# Patient Record
Sex: FEMALE | Employment: OTHER | URBAN - METROPOLITAN AREA
[De-identification: names, ages, dates, MRNs, and addresses within clinical notes are randomized per-mention and may not be internally consistent; named-entity substitution may affect disease eponyms.]

---

## 2019-08-16 ENCOUNTER — PREPPED CHART (OUTPATIENT)
Dept: URBAN - METROPOLITAN AREA CLINIC 79 | Facility: CLINIC | Age: 64
End: 2019-08-16

## 2020-03-12 ENCOUNTER — RX CHECK (OUTPATIENT)
Dept: URBAN - METROPOLITAN AREA CLINIC 79 | Facility: CLINIC | Age: 65
End: 2020-03-12

## 2020-03-12 DIAGNOSIS — H52.13: ICD-10-CM

## 2020-03-12 PROCEDURE — 92015 DETERMINE REFRACTIVE STATE: CPT

## 2020-03-12 ASSESSMENT — VISUAL ACUITY
OD_SC: 20/400
OS_SC: 20/300
OS_CC: J5
OD_CC: J16
OD_CC: 20/40
OD_PH: 20/25+2
OS_CC: 20/30+2

## 2020-06-19 ENCOUNTER — CONSULT EP (OUTPATIENT)
Dept: URBAN - METROPOLITAN AREA CLINIC 79 | Facility: CLINIC | Age: 65
End: 2020-06-19

## 2020-06-19 VITALS — HEIGHT: 55 IN

## 2020-06-19 DIAGNOSIS — H52.13: ICD-10-CM

## 2020-06-19 DIAGNOSIS — H25.13: ICD-10-CM

## 2020-06-19 PROCEDURE — 92134 CPTRZ OPH DX IMG PST SGM RTA: CPT

## 2020-06-19 PROCEDURE — 92014 COMPRE OPH EXAM EST PT 1/>: CPT

## 2020-06-19 ASSESSMENT — VISUAL ACUITY
OD_CC: 20/40-2
OS_SC: J5
OD_SC: 20/400
OS_SC: 20/500
OD_SC: J7
OS_CC: 20/50-2
OS_PH: 20/30

## 2020-07-01 ENCOUNTER — CONSULT EP (OUTPATIENT)
Dept: URBAN - METROPOLITAN AREA CLINIC 79 | Facility: CLINIC | Age: 65
End: 2020-07-01

## 2020-07-01 VITALS — HEIGHT: 55 IN

## 2020-07-01 DIAGNOSIS — H35.413: ICD-10-CM

## 2020-07-01 DIAGNOSIS — Q14.1: ICD-10-CM

## 2020-07-01 PROCEDURE — 92014 COMPRE OPH EXAM EST PT 1/>: CPT

## 2020-07-01 PROCEDURE — 92250 FUNDUS PHOTOGRAPHY W/I&R: CPT

## 2020-07-01 ASSESSMENT — VISUAL ACUITY
OS_CC: 20/30
OD_CC: 20/30-2

## 2020-07-01 ASSESSMENT — TONOMETRY
OD_IOP_MMHG: 10
OS_IOP_MMHG: 10

## 2020-07-07 ENCOUNTER — FOLLOW UP (OUTPATIENT)
Dept: URBAN - METROPOLITAN AREA CLINIC 79 | Facility: CLINIC | Age: 65
End: 2020-07-07

## 2020-07-07 VITALS — HEIGHT: 55 IN

## 2020-07-07 DIAGNOSIS — H25.12: ICD-10-CM

## 2020-07-07 DIAGNOSIS — H52.13: ICD-10-CM

## 2020-07-07 PROCEDURE — 92136 OPHTHALMIC BIOMETRY: CPT

## 2020-07-07 PROCEDURE — 92025 CPTRIZED CORNEAL TOPOGRAPHY: CPT

## 2020-07-07 PROCEDURE — 92012 INTRM OPH EXAM EST PATIENT: CPT

## 2020-07-07 ASSESSMENT — TONOMETRY
OS_IOP_MMHG: 12
OD_IOP_MMHG: 12

## 2020-07-07 ASSESSMENT — VISUAL ACUITY
OD_CC: 20/30-2
OS_CC: 20/30-2

## 2020-07-15 ENCOUNTER — SURGERY/PROCEDURE (OUTPATIENT)
Dept: URBAN - METROPOLITAN AREA SURGICAL CENTER 17 | Facility: SURGICAL CENTER | Age: 65
End: 2020-07-15

## 2020-07-15 DIAGNOSIS — H25.12: ICD-10-CM

## 2020-07-15 PROCEDURE — 66984 XCAPSL CTRC RMVL W/O ECP: CPT

## 2020-07-16 ENCOUNTER — 1 DAY POST-OP (OUTPATIENT)
Dept: URBAN - METROPOLITAN AREA CLINIC 79 | Facility: CLINIC | Age: 65
End: 2020-07-16

## 2020-07-16 VITALS — HEIGHT: 55 IN

## 2020-07-16 DIAGNOSIS — Z96.1: ICD-10-CM

## 2020-07-16 PROCEDURE — 99024 POSTOP FOLLOW-UP VISIT: CPT

## 2020-07-16 ASSESSMENT — TONOMETRY: OS_IOP_MMHG: 14

## 2020-07-16 ASSESSMENT — VISUAL ACUITY
OS_SC: 20/20
OS_SC: 20/30

## 2020-07-21 ENCOUNTER — FOLLOW UP (OUTPATIENT)
Dept: URBAN - METROPOLITAN AREA CLINIC 79 | Facility: CLINIC | Age: 65
End: 2020-07-21

## 2020-07-21 DIAGNOSIS — H25.11: ICD-10-CM

## 2020-07-21 PROCEDURE — 92136 OPHTHALMIC BIOMETRY: CPT | Mod: 26,RT

## 2020-07-21 PROCEDURE — 92012 INTRM OPH EXAM EST PATIENT: CPT | Mod: 24

## 2020-07-21 ASSESSMENT — VISUAL ACUITY
OS_SC: 20/20-1
OS_SC: J1
OD_CC: 20/40-2

## 2020-07-21 ASSESSMENT — TONOMETRY
OS_IOP_MMHG: 16
OD_IOP_MMHG: 14

## 2020-08-04 ENCOUNTER — FOLLOW UP (OUTPATIENT)
Dept: URBAN - METROPOLITAN AREA CLINIC 79 | Facility: CLINIC | Age: 65
End: 2020-08-04

## 2020-08-04 VITALS — HEIGHT: 55 IN

## 2020-08-04 DIAGNOSIS — Z96.1: ICD-10-CM

## 2020-08-04 PROCEDURE — 99024 POSTOP FOLLOW-UP VISIT: CPT

## 2020-08-04 ASSESSMENT — VISUAL ACUITY
OD_SC: 20/300
OD_PH: 20/70
OS_SC: 20/20
OU_SC: J1

## 2020-08-07 ENCOUNTER — FOLLOW UP (OUTPATIENT)
Dept: URBAN - METROPOLITAN AREA CLINIC 79 | Facility: CLINIC | Age: 65
End: 2020-08-07

## 2020-08-07 VITALS — HEIGHT: 55 IN

## 2020-08-07 DIAGNOSIS — Z96.1: ICD-10-CM

## 2020-08-07 DIAGNOSIS — H01.02A: ICD-10-CM

## 2020-08-07 PROCEDURE — 99024 POSTOP FOLLOW-UP VISIT: CPT

## 2020-08-07 ASSESSMENT — VISUAL ACUITY
OD_PH: 20/70-2
OD_SC: 20/300
OS_SC: 20/20

## 2020-08-10 ENCOUNTER — SURGERY/PROCEDURE (OUTPATIENT)
Dept: URBAN - METROPOLITAN AREA SURGICAL CENTER 17 | Facility: SURGICAL CENTER | Age: 65
End: 2020-08-10

## 2020-08-10 DIAGNOSIS — H25.11: ICD-10-CM

## 2020-08-10 PROCEDURE — 66984 XCAPSL CTRC RMVL W/O ECP: CPT | Mod: 79,RT

## 2020-08-11 ENCOUNTER — 1 DAY POST-OP (OUTPATIENT)
Dept: URBAN - METROPOLITAN AREA CLINIC 79 | Facility: CLINIC | Age: 65
End: 2020-08-11

## 2020-08-11 VITALS — HEIGHT: 55 IN

## 2020-08-11 DIAGNOSIS — Z96.1: ICD-10-CM

## 2020-08-11 PROCEDURE — 99024 POSTOP FOLLOW-UP VISIT: CPT

## 2020-08-11 ASSESSMENT — VISUAL ACUITY
OD_SC: 20/30-2
OU_SC: 20/20
OS_SC: J1
OU_SC: J1+
OS_SC: 20/20
OD_SC: J1

## 2020-08-11 ASSESSMENT — TONOMETRY
OD_IOP_MMHG: 18
OS_IOP_MMHG: 15

## 2020-08-20 ENCOUNTER — 1 WEEK POST-OP (OUTPATIENT)
Dept: URBAN - METROPOLITAN AREA CLINIC 79 | Facility: CLINIC | Age: 65
End: 2020-08-20

## 2020-08-20 VITALS — HEIGHT: 55 IN

## 2020-08-20 DIAGNOSIS — Z96.1: ICD-10-CM

## 2020-08-20 PROCEDURE — 99024 POSTOP FOLLOW-UP VISIT: CPT

## 2020-08-20 ASSESSMENT — VISUAL ACUITY
OD_SC: 20/20-1
OD_SC: J1
OS_SC: J1
OS_SC: 20/20-1

## 2020-09-24 ENCOUNTER — POST-OP CHECK (OUTPATIENT)
Dept: URBAN - METROPOLITAN AREA CLINIC 79 | Facility: CLINIC | Age: 65
End: 2020-09-24

## 2020-09-24 VITALS — HEIGHT: 55 IN

## 2020-09-24 DIAGNOSIS — Q14.1: ICD-10-CM

## 2020-09-24 DIAGNOSIS — Z96.1: ICD-10-CM

## 2020-09-24 PROCEDURE — 99024 POSTOP FOLLOW-UP VISIT: CPT

## 2020-09-24 PROCEDURE — 92015 DETERMINE REFRACTIVE STATE: CPT | Mod: NC

## 2020-09-24 ASSESSMENT — TONOMETRY
OS_IOP_MMHG: 10
OD_IOP_MMHG: 10

## 2020-09-24 ASSESSMENT — VISUAL ACUITY
OS_SC: 20/20-1
OD_SC: J2
OS_SC: J2
OD_SC: 20/20-1

## 2021-03-04 ENCOUNTER — FOLLOW UP (OUTPATIENT)
Dept: URBAN - METROPOLITAN AREA CLINIC 79 | Facility: CLINIC | Age: 66
End: 2021-03-04

## 2021-03-04 VITALS — HEIGHT: 55 IN

## 2021-03-04 DIAGNOSIS — Z96.1: ICD-10-CM

## 2021-03-04 PROCEDURE — 99214 OFFICE O/P EST MOD 30 MIN: CPT

## 2021-03-04 ASSESSMENT — VISUAL ACUITY
OD_SC: 20/20-1
OS_SC: 20/20

## 2021-08-17 ENCOUNTER — FOLLOW UP (OUTPATIENT)
Dept: URBAN - METROPOLITAN AREA CLINIC 79 | Facility: CLINIC | Age: 66
End: 2021-08-17

## 2021-08-17 DIAGNOSIS — H53.142: ICD-10-CM

## 2021-08-17 DIAGNOSIS — H35.413: ICD-10-CM

## 2021-08-17 DIAGNOSIS — Q14.1: ICD-10-CM

## 2021-08-17 DIAGNOSIS — Z83.518: ICD-10-CM

## 2021-08-17 DIAGNOSIS — H35.362: ICD-10-CM

## 2021-08-17 PROCEDURE — 92250 FUNDUS PHOTOGRAPHY W/I&R: CPT | Mod: 59

## 2021-08-17 PROCEDURE — 99214 OFFICE O/P EST MOD 30 MIN: CPT

## 2021-08-17 PROCEDURE — 92134 CPTRZ OPH DX IMG PST SGM RTA: CPT | Mod: 59

## 2021-08-17 ASSESSMENT — VISUAL ACUITY
OS_SC: 20/20-1
OD_SC: 20/25-2

## 2021-08-17 ASSESSMENT — TONOMETRY
OD_IOP_MMHG: 11
OS_IOP_MMHG: 12

## 2021-10-12 ENCOUNTER — FOLLOW UP (OUTPATIENT)
Dept: URBAN - METROPOLITAN AREA CLINIC 79 | Facility: CLINIC | Age: 66
End: 2021-10-12

## 2021-10-12 DIAGNOSIS — H26.493: ICD-10-CM

## 2021-10-12 PROCEDURE — 66821 AFTER CATARACT LASER SURGERY: CPT

## 2021-10-12 PROCEDURE — 99214 OFFICE O/P EST MOD 30 MIN: CPT | Mod: 25

## 2021-10-12 ASSESSMENT — TONOMETRY
OD_IOP_MMHG: 10
OS_IOP_MMHG: 11

## 2021-10-12 ASSESSMENT — VISUAL ACUITY
OS_SC: 20/20-1
OD_SC: 20/25-2

## 2021-10-26 ENCOUNTER — POST-OP CHECK (OUTPATIENT)
Dept: URBAN - METROPOLITAN AREA CLINIC 79 | Facility: CLINIC | Age: 66
End: 2021-10-26

## 2021-10-26 DIAGNOSIS — H26.493: ICD-10-CM

## 2021-10-26 PROCEDURE — 99024 POSTOP FOLLOW-UP VISIT: CPT

## 2021-10-26 ASSESSMENT — VISUAL ACUITY
OD_SC: 20/25
OS_SC: 20/20

## 2021-10-26 ASSESSMENT — TONOMETRY
OS_IOP_MMHG: 13
OD_IOP_MMHG: 12

## 2022-03-29 ENCOUNTER — ESTABLISHED COMPREHENSIVE EXAM (OUTPATIENT)
Dept: URBAN - METROPOLITAN AREA CLINIC 79 | Facility: CLINIC | Age: 67
End: 2022-03-29

## 2022-03-29 DIAGNOSIS — H35.413: ICD-10-CM

## 2022-03-29 DIAGNOSIS — Z96.1: ICD-10-CM

## 2022-03-29 DIAGNOSIS — H35.362: ICD-10-CM

## 2022-03-29 DIAGNOSIS — H26.491: ICD-10-CM

## 2022-03-29 PROCEDURE — 92014 COMPRE OPH EXAM EST PT 1/>: CPT

## 2022-03-29 PROCEDURE — 92134 CPTRZ OPH DX IMG PST SGM RTA: CPT

## 2022-03-29 PROCEDURE — 66821 AFTER CATARACT LASER SURGERY: CPT | Mod: 57,RT

## 2022-03-29 ASSESSMENT — VISUAL ACUITY
OD_SC: 20/30
OD_SC: 20/30
OS_SC: 20/20
OS_SC: 20/30
OD_CC: 20/25
OS_CC: 20/20

## 2022-03-29 ASSESSMENT — TONOMETRY
OS_IOP_MMHG: 11
OD_IOP_MMHG: 10

## 2022-04-15 ENCOUNTER — POST-OP CHECK (OUTPATIENT)
Dept: URBAN - METROPOLITAN AREA CLINIC 79 | Facility: CLINIC | Age: 67
End: 2022-04-15

## 2022-04-15 DIAGNOSIS — Z96.1: ICD-10-CM

## 2022-04-15 PROCEDURE — 99024 POSTOP FOLLOW-UP VISIT: CPT

## 2022-04-15 ASSESSMENT — VISUAL ACUITY
OS_CC: 20/25
OD_SC: 20/30-1
OD_CC: 20/25
OS_SC: 20/20

## 2022-04-15 ASSESSMENT — TONOMETRY
OD_IOP_MMHG: 10
OS_IOP_MMHG: 12

## 2022-05-03 ENCOUNTER — PROBLEM (OUTPATIENT)
Dept: URBAN - METROPOLITAN AREA CLINIC 79 | Facility: CLINIC | Age: 67
End: 2022-05-03

## 2022-05-03 DIAGNOSIS — H16.223: ICD-10-CM

## 2022-05-03 PROCEDURE — 92012 INTRM OPH EXAM EST PATIENT: CPT

## 2022-05-03 ASSESSMENT — VISUAL ACUITY
OS_SC: 20/20-1
OD_SC: 20/25

## 2022-05-03 ASSESSMENT — TONOMETRY
OS_IOP_MMHG: 14
OD_IOP_MMHG: 09

## 2022-09-15 ENCOUNTER — FOLLOW UP (OUTPATIENT)
Dept: URBAN - METROPOLITAN AREA CLINIC 79 | Facility: CLINIC | Age: 67
End: 2022-09-15

## 2022-09-15 DIAGNOSIS — Z96.1: ICD-10-CM

## 2022-09-15 DIAGNOSIS — H43.813: ICD-10-CM

## 2022-09-15 DIAGNOSIS — H35.362: ICD-10-CM

## 2022-09-15 DIAGNOSIS — H35.413: ICD-10-CM

## 2022-09-15 DIAGNOSIS — Q14.1: ICD-10-CM

## 2022-09-15 DIAGNOSIS — H52.13: ICD-10-CM

## 2022-09-15 PROCEDURE — 92014 COMPRE OPH EXAM EST PT 1/>: CPT

## 2022-09-15 PROCEDURE — 92134 CPTRZ OPH DX IMG PST SGM RTA: CPT

## 2022-09-15 PROCEDURE — 92250 FUNDUS PHOTOGRAPHY W/I&R: CPT

## 2022-09-15 ASSESSMENT — TONOMETRY
OD_IOP_MMHG: 10
OS_IOP_MMHG: 11

## 2022-09-15 ASSESSMENT — VISUAL ACUITY
OS_SC: 20/20-1
OD_SC: 20/20

## 2023-03-31 ENCOUNTER — ESTABLISHED COMPREHENSIVE EXAM (OUTPATIENT)
Dept: URBAN - METROPOLITAN AREA CLINIC 79 | Facility: CLINIC | Age: 68
End: 2023-03-31

## 2023-03-31 DIAGNOSIS — H35.362: ICD-10-CM

## 2023-03-31 DIAGNOSIS — H16.223: ICD-10-CM

## 2023-03-31 PROCEDURE — 92014 COMPRE OPH EXAM EST PT 1/>: CPT

## 2023-03-31 PROCEDURE — 92134 CPTRZ OPH DX IMG PST SGM RTA: CPT

## 2023-03-31 ASSESSMENT — VISUAL ACUITY
OD_SC: 20/20-1
OS_CC: 20/25
OD_CC: 20/30
OS_SC: 20/20

## 2023-03-31 ASSESSMENT — TONOMETRY
OD_IOP_MMHG: 10
OS_IOP_MMHG: 10

## 2023-09-19 ENCOUNTER — FOLLOW UP (OUTPATIENT)
Dept: URBAN - METROPOLITAN AREA CLINIC 79 | Facility: CLINIC | Age: 68
End: 2023-09-19

## 2023-09-19 DIAGNOSIS — Q14.1: ICD-10-CM

## 2023-09-19 DIAGNOSIS — H35.362: ICD-10-CM

## 2023-09-19 DIAGNOSIS — H35.413: ICD-10-CM

## 2023-09-19 DIAGNOSIS — H52.13: ICD-10-CM

## 2023-09-19 DIAGNOSIS — H35.372: ICD-10-CM

## 2023-09-19 DIAGNOSIS — H43.813: ICD-10-CM

## 2023-09-19 DIAGNOSIS — Z96.1: ICD-10-CM

## 2023-09-19 PROCEDURE — 92012 INTRM OPH EXAM EST PATIENT: CPT

## 2023-09-19 PROCEDURE — 92250 FUNDUS PHOTOGRAPHY W/I&R: CPT | Mod: NC

## 2023-09-19 PROCEDURE — 92134 CPTRZ OPH DX IMG PST SGM RTA: CPT

## 2023-09-19 ASSESSMENT — VISUAL ACUITY
OS_SC: 20/20
OD_SC: 20/20

## 2023-09-19 ASSESSMENT — TONOMETRY
OS_IOP_MMHG: 11
OD_IOP_MMHG: 11

## 2024-04-23 ENCOUNTER — ESTABLISHED COMPREHENSIVE EXAM (OUTPATIENT)
Dept: URBAN - METROPOLITAN AREA CLINIC 79 | Facility: CLINIC | Age: 69
End: 2024-04-23

## 2024-04-23 DIAGNOSIS — H43.813: ICD-10-CM

## 2024-04-23 DIAGNOSIS — H35.413: ICD-10-CM

## 2024-04-23 DIAGNOSIS — H35.362: ICD-10-CM

## 2024-04-23 DIAGNOSIS — H35.372: ICD-10-CM

## 2024-04-23 DIAGNOSIS — H16.223: ICD-10-CM

## 2024-04-23 PROCEDURE — 92134 CPTRZ OPH DX IMG PST SGM RTA: CPT

## 2024-04-23 PROCEDURE — 92014 COMPRE OPH EXAM EST PT 1/>: CPT

## 2024-04-23 ASSESSMENT — TONOMETRY
OD_IOP_MMHG: 8
OS_IOP_MMHG: 9

## 2024-04-23 ASSESSMENT — VISUAL ACUITY
OS_SC: 20/20
OD_SC: 20/20-2
OS_SC: 20/20-2
OD_SC: 20/20

## 2024-09-26 ENCOUNTER — FOLLOW UP (OUTPATIENT)
Dept: URBAN - METROPOLITAN AREA CLINIC 79 | Facility: CLINIC | Age: 69
End: 2024-09-26

## 2024-09-26 DIAGNOSIS — H52.13: ICD-10-CM

## 2024-09-26 DIAGNOSIS — H35.362: ICD-10-CM

## 2024-09-26 DIAGNOSIS — H35.372: ICD-10-CM

## 2024-09-26 DIAGNOSIS — I10: ICD-10-CM

## 2024-09-26 DIAGNOSIS — H43.813: ICD-10-CM

## 2024-09-26 DIAGNOSIS — Q14.1: ICD-10-CM

## 2024-09-26 DIAGNOSIS — H35.413: ICD-10-CM

## 2024-09-26 PROCEDURE — 92202 OPSCPY EXTND ON/MAC DRAW: CPT | Mod: NC

## 2024-09-26 PROCEDURE — 92014 COMPRE OPH EXAM EST PT 1/>: CPT

## 2024-09-26 PROCEDURE — 92250 FUNDUS PHOTOGRAPHY W/I&R: CPT

## 2024-09-26 PROCEDURE — 92134 CPTRZ OPH DX IMG PST SGM RTA: CPT | Mod: NC

## 2024-09-26 ASSESSMENT — VISUAL ACUITY
OD_SC: 20/20-2
OS_SC: 20/20

## 2024-09-26 ASSESSMENT — TONOMETRY
OS_IOP_MMHG: 13
OD_IOP_MMHG: 11

## 2024-12-03 ENCOUNTER — HOSPITAL ENCOUNTER (OUTPATIENT)
Dept: HOSPITAL 99 - PAVMRI | Age: 69
End: 2024-12-03
Payer: MEDICARE

## 2024-12-03 DIAGNOSIS — M54.50: Primary | ICD-10-CM

## 2025-01-03 ENCOUNTER — HOSPITAL ENCOUNTER (INPATIENT)
Dept: HOSPITAL 99 - 2 SOUTH | Age: 70
LOS: 3 days | Discharge: HOME HEALTH | DRG: 522 | End: 2025-01-06
Payer: MEDICARE

## 2025-01-03 VITALS
RESPIRATION RATE: 18 BRPM | OXYGEN SATURATION: 1 % | SYSTOLIC BLOOD PRESSURE: 141 MMHG | DIASTOLIC BLOOD PRESSURE: 81 MMHG

## 2025-01-03 VITALS
RESPIRATION RATE: 14 BRPM | SYSTOLIC BLOOD PRESSURE: 123 MMHG | DIASTOLIC BLOOD PRESSURE: 75 MMHG | OXYGEN SATURATION: 1 %

## 2025-01-03 VITALS
SYSTOLIC BLOOD PRESSURE: 120 MMHG | DIASTOLIC BLOOD PRESSURE: 68 MMHG | RESPIRATION RATE: 18 BRPM | OXYGEN SATURATION: 1 %

## 2025-01-03 VITALS — RESPIRATION RATE: 16 BRPM | SYSTOLIC BLOOD PRESSURE: 131 MMHG | DIASTOLIC BLOOD PRESSURE: 82 MMHG

## 2025-01-03 VITALS — DIASTOLIC BLOOD PRESSURE: 75 MMHG | RESPIRATION RATE: 15 BRPM | SYSTOLIC BLOOD PRESSURE: 123 MMHG

## 2025-01-03 VITALS
DIASTOLIC BLOOD PRESSURE: 68 MMHG | SYSTOLIC BLOOD PRESSURE: 120 MMHG | RESPIRATION RATE: 18 BRPM | OXYGEN SATURATION: 1 %

## 2025-01-03 VITALS — SYSTOLIC BLOOD PRESSURE: 150 MMHG | RESPIRATION RATE: 18 BRPM | DIASTOLIC BLOOD PRESSURE: 79 MMHG

## 2025-01-03 VITALS — BODY MASS INDEX: 21.6 KG/M2

## 2025-01-03 VITALS — RESPIRATION RATE: 18 BRPM | SYSTOLIC BLOOD PRESSURE: 144 MMHG | DIASTOLIC BLOOD PRESSURE: 71 MMHG

## 2025-01-03 VITALS — RESPIRATION RATE: 16 BRPM | DIASTOLIC BLOOD PRESSURE: 75 MMHG | OXYGEN SATURATION: 1 %

## 2025-01-03 VITALS — SYSTOLIC BLOOD PRESSURE: 141 MMHG | DIASTOLIC BLOOD PRESSURE: 86 MMHG | RESPIRATION RATE: 16 BRPM

## 2025-01-03 VITALS — DIASTOLIC BLOOD PRESSURE: 81 MMHG | RESPIRATION RATE: 18 BRPM | SYSTOLIC BLOOD PRESSURE: 136 MMHG

## 2025-01-03 VITALS — SYSTOLIC BLOOD PRESSURE: 120 MMHG | DIASTOLIC BLOOD PRESSURE: 81 MMHG | RESPIRATION RATE: 16 BRPM

## 2025-01-03 VITALS — DIASTOLIC BLOOD PRESSURE: 81 MMHG | SYSTOLIC BLOOD PRESSURE: 104 MMHG

## 2025-01-03 DIAGNOSIS — E78.5: ICD-10-CM
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
## 2025-01-03 DIAGNOSIS — D62: ICD-10-CM

## 2025-01-03 DIAGNOSIS — I10: ICD-10-CM

## 2025-01-03 DIAGNOSIS — S72.012A: Primary | ICD-10-CM

## 2025-01-03 LAB
ALBUMIN SERPL-MCNC: 4.2 G/DL (ref 3.5–5)
ALP SERPL-CCNC: 52 U/L (ref 38–126)
ALT SERPL-CCNC: 21 U/L (ref 0–35)
APTT PPP: 25.3 SEC (ref 23.4–35)
AST SERPL-CCNC: 28 U/L (ref 14–36)
BUN SERPL-MCNC: 20 MG/DL (ref 7–17)
CALCIUM SERPL-MCNC: 9.9 MG/DL (ref 8.4–10.2)
CHLORIDE SERPL-SCNC: 105 MMOL/L (ref 98–107)
CO2 SERPL-SCNC: 27 MMOL/L (ref 22–30)
EGFR: > 60
ERYTHROCYTE [DISTWIDTH] IN BLOOD BY AUTOMATED COUNT: 12.9 % (ref 11.5–14.5)
GLUCOSE SERPL-MCNC: 101 MG/DL (ref 70–99)
HCT VFR BLD AUTO: 40.9 % (ref 37–47)
HGB BLD-MCNC: 13.8 G/DL (ref 12–16)
INR PPP: 0.97
MCHC RBC AUTO-ENTMCNC: 33.7 G/DL (ref 33–37)
MCV RBC AUTO: 94 FL (ref 81–99)
NRBC BLD AUTO-RTO: 0 %
PLATELET # BLD AUTO: 237 10^3/UL (ref 130–400)
POTASSIUM SERPL-SCNC: 4.1 MMOL/L (ref 3.5–5.1)
PROT SERPL-MCNC: 6.6 G/DL (ref 6.3–8.2)
PROTHROMBIN TIME: 13.2 SEC (ref 11.4–14.6)
SODIUM SERPL-SCNC: 142 MMOL/L (ref 135–145)

## 2025-01-03 PROCEDURE — 0SRS0J9 REPLACEMENT OF LEFT HIP JOINT, FEMORAL SURFACE WITH SYNTHETIC SUBSTITUTE, CEMENTED, OPEN APPROACH: ICD-10-PCS | Performed by: STUDENT IN AN ORGANIZED HEALTH CARE EDUCATION/TRAINING PROGRAM

## 2025-01-03 PROCEDURE — C1713 ANCHOR/SCREW BN/BN,TIS/BN: HCPCS

## 2025-01-03 PROCEDURE — C1776 JOINT DEVICE (IMPLANTABLE): HCPCS

## 2025-01-03 RX ADMIN — DOCUSATE SODIUM 100 MG: 100 CAPSULE, LIQUID FILLED ORAL at 21:15

## 2025-01-03 RX ADMIN — CEFAZOLIN 5: 10 INJECTION, POWDER, FOR SOLUTION INTRAVENOUS at 23:36

## 2025-01-03 RX ADMIN — ACETAMINOPHEN: 325 TABLET ORAL at 21:50

## 2025-01-03 RX ADMIN — SENNOSIDES 17.2 MG: 8.6 TABLET ORAL at 21:15

## 2025-01-03 RX ADMIN — ASPIRIN 325 MG: 325 TABLET ORAL at 21:15

## 2025-01-03 RX ADMIN — HYDROMORPHONE HYDROCHLORIDE 1 MG: 1 INJECTION, SOLUTION INTRAMUSCULAR; INTRAVENOUS; SUBCUTANEOUS at 13:44

## 2025-01-03 RX ADMIN — HYDROMORPHONE HYDROCHLORIDE 0.5 MG: 1 INJECTION, SOLUTION INTRAMUSCULAR; INTRAVENOUS; SUBCUTANEOUS at 11:32

## 2025-01-03 RX ADMIN — ACETAMINOPHEN: 325 TABLET ORAL at 21:15

## 2025-01-03 RX ADMIN — ONDANSETRON HYDROCHLORIDE 4 MG: 2 SOLUTION INTRAMUSCULAR; INTRAVENOUS at 10:16

## 2025-01-03 RX ADMIN — ONDANSETRON HYDROCHLORIDE 4 MG: 2 SOLUTION INTRAMUSCULAR; INTRAVENOUS at 16:03

## 2025-01-03 RX ADMIN — ACETAMINOPHEN 650 MG: 325 TABLET ORAL at 23:36

## 2025-01-03 RX ADMIN — HYDROMORPHONE HYDROCHLORIDE 1 MG: 1 INJECTION, SOLUTION INTRAMUSCULAR; INTRAVENOUS; SUBCUTANEOUS at 10:18

## 2025-01-03 NOTE — PTCARENOTE
pt arrived to floor, transferred to bed. pt is actively vomiting and c/o 9/10 pain. oriented to room and bed/remote/call bell controls. hospitalist notified of pts arrival and deferred to NP. order obtained from NP for zofran

## 2025-01-03 NOTE — PTCARENOTE
"contacted by OR for report on pt going to surgery to repair left hip fx. pt had not discussed an operation with a surgeon or hospitalist to this point, although she admits that the ER MD had said it would likely be necessary. she also had not been "~"seen by hospitalists. i asked for someone to come up and speak to the pt and her spouse, but was refused by OR RN. pt advised of the situation and agreed to go down to the OR to meet with the surgeon. on transfer, pt began vomiting again"

## 2025-01-03 NOTE — W.IMMPOSTOP
"Surgical Immed Post Op Note"~"-"~"-: "~"Primary Surgeon:  Juan Miguel Tejada MD"~"Assisting Surgeon:  Vielka Granger PA-C"~"Pre-op Diagnosis:  left femoral neck fracture"~"Post-op Diagnosis:   left femoral neck fracture"~"Procedure Performed:  left hip hemiarthroplasty"~"Anesthesia Type:  general, spinal"~"Specimen / Cultures:  none"~"Estimated Blood Loss:  100mL"~"Complications:  none apparent"~"Operative Findings:  subcapital femoral neck fracture"~"Implants: [employer] size 13 Heritage high offset cemented stem, +7 neck adapter, 47mm endoprosthetic head"~"Dictation #: [id number]"

## 2025-01-03 NOTE — PTCARENOTE
Pt arrived from PACU at 21:00. Admitted to 2S prior to surgery. Pt AOx3, call light in reach, bed in low position, surgical dressing C/D/I. Pt denies pain,  at beside, care ongoing. .

## 2025-01-03 NOTE — HPS.HSE
"Family Physician"~"-"~"-: "~"Family Physician:  Rashi Lofton"~"Chief Complaint"~"-"~"-: "~"fall"~"History of Present Illness"~"-: "~"69-year-old female with a past medical history of hypertension, small bowel obstruction  presents to the ER for evaluation of left hip injury after a fall.  Patient was leaving the bank and as she was coming out of the ramp, her right leg lost the "~"balance and patient fell on her left side. she landed on her left hip. denied hitting head on the floor. denied dizzy or syncope. denied fever, chills,chest pain, sob. denied abdominal pain,n,v,d. denied dysuria or hematuria. "~"x ray with hip fracture. admitting for further management. "~"Medical History"~"Past Medical History"~"Past Medical History: Reports Other"~"Additional Past Medical History: "~"Hypertension"~"Hyperlipidemia"~"SBO"~"Past Surgical History: Reports Appendectomy"~"Social History"~"Tobacco: Non-smoker"~"Alcohol: Occasional"~"Drug: None"~"Personal: "~"Living: With Family"~"Family History"~"Family History: Not pertinent"~"Allergies / Home Medications"~"-: "~"Allergies reflects when Allergies were last updated in [facility]."~"Home Medications with original date entered in [facility] "~"Allergy/Medication List: "~"Allergies"~"Allergy/AdvReac	Type	Severity	Reaction	Status	Date / Time"~"Penicillins	Allergy		Unknown	Verified	01/03/25 09:48"~"Home Medications"~"fenofibrate 160 mg tablet 160 mg PO DAILY 01/03/25 "~"fluticasone propionate 50 mcg/actuation nasal spray,suspension 1 spray intranasal DAILY 01/03/25 "~"valsartan 160 mg tablet 160 mg PO DAILY 01/03/25 "~"Review of Systems"~"-"~"Constitutional: Reports No Symptoms"~"EENT: Reports No Symptoms"~"Respiratory: Reports No Symptoms"~"Cardiac: Reports No Symptoms"~"Abdomen/GI: Reports No Symptoms"~": Reports No Symptoms"~"Musculoskeletal: Reports Other (left hip pain)"~"Skin: Reports No Symptoms"~"Neurological: Reports No Symptoms"~"Endocrine: Reports No Symptoms"~"Hematologic/Lymphatic: Reports No Symptoms"~"Psych: Reports No Symptoms"~"Physical Exam"~"Vital Signs"~"-: "~"Vital Signs"~"Temp	Pulse	Resp	BP	Pulse Ox"~" 98.0 F 	 63 	 18 	 144/71 	 92 "~" 01/03/25 09:55	 01/03/25 11:30	 01/03/25 11:30	 01/03/25 11:30	 01/03/25 11:30"~"Physical Exam"~"General: Well Developed, Well Nourished and No Apparent Distress"~"HEENT: NormoCephalic, Moist mucous membranes and Atraumatic"~"Respiratory: Clear"~"Cardiac: S1/S2 and Regular Rhythm; No Murmur or Rub"~"GI: Soft, Non Tender, Non Distended and Normal Bowel Sounds; No Organomegaly"~"Rectal: Deferred by Provider"~"Musculoskeletal: No Clubbing, No Cyanosis, No Edema and Other ( left lower extremity shortened and externally rotated)"~"Skin: No Rash"~"Neuro: AO x 3 and Nonfocal/grossly intact"~"Psych: Calm"~"Laboratory Results"~"-"~"-: "~"01/03/25 10:12 "~"01/03/25 10:12 "~"Laboratory Results"~"PT	 13.2 Sec (11.4-14.6)  	01/03/25  10:12    "~"INR	 0.97  	01/03/25  10:12    "~"APTT	 25.3 Sec (23.4-35.0)  	01/03/25  10:12    "~"Total Bilirubin	 0.5 mg/dl (0.2-1.3)  	01/03/25  10:12    "~"AST	 28 U/L (14-36)  	01/03/25  10:12    "~"ALT	 21 U/L (0-35)  	01/03/25  10:12    "~"Alkaline Phosphatase	 52 U/L ()  	01/03/25  10:12    "~"Data Reviewed"~"-"~"Diagnostic Radiology: Report Reviewed by me"~"Lab Data: Labs Reviewed by me"~"Impression/Plan"~"-"~"-: "~"# Left hip fracture after fall"~"-Keep patient n.p.o."~"- OR today"~"-Orthopedics consulted"~"-Tylenol, Oxy and Dilaudid for pain"~"-Hip x-ray with acute angulated subcapital left hip fractures"~"#HTN"~"-fenofibrate continued"~"#HTN"~"-valsartan continued"~"#DVT prophylaxis"~"-scd"~"#CODE status"~"-full code"

## 2025-01-03 NOTE — W.PN.UPDATE
"Update Note"~"Progress Note Update"~"-: "~"This note serves as an addendum to the H&P by extender NIKOLAS "~"Diya CECILIA"~"HPI"~"69F HX SBO, HTN , HLD evaluation of left hip injury after a fall.  "~"-  tripped and fell off the side of the handicap ramp"~"- landed on her left hip and has severe pain in the left side since. "~"ROS;"~"- No CP"~"- No SoB"~"- No palitation  "~"-  denies any head strike."~"-  denies any pain in the back.  "~"-  denies any chest/rib pain or abdominal pain.  "~"-  denies being on any blood thinners."~"Reviewed VS: stable and unremarkable "~"Vital Signs"~"Temp	Pulse	Resp	BP	Pulse Ox"~" 98.0 F 	 63 	 18 	 144/71 	 92 "~" 01/03/25 09:55	 01/03/25 11:30	 01/03/25 11:30	 01/03/25 11:30	 01/03/25 11:30"~"PE"~"General: Awake, alert, oriented x3; appears uncomfortable"~"Lungs: CTA  no wheezing, rales, rhonchi"~"Heart: RRR, no murmurs, gallops, or rubs; no chest wall tenderness"~"Extremities: "~"LLEx:  shortened and externally rotated; severe pain with any attempted range of motion left hip and some tenderness of the lateral hip; no tenderness of the left knee or ankle, good pulses throughout left lower extremity femoral, popliteal, "~"DP/PT; right lower extremity and upper extremities are atraumatic with good pulses"~"Data"~"Unremarkable CBC "~"Unremarkable CMP "~"Lt  Hip XR"~"Acute angulated subcapital left hip fracture. "~"No prior  or  hospitalist admission:"~"ASSESSMENT & PLAN"~"Acute angulated subcapital left hip fracture"~"S/P mechanical fall "~"RCRI Zero "~"Medically acceptable to proceed for OR "~"Benefits of ORIF outweigh the periop risks"~"- Last solid food : 9 PM  last night 1/2/25 "~"- Keep NPO and IVF"~"- PRN analgesia and PRN anti emetics "~"- Ortho consulted "~"Benign HTN"~"- stable "~"- cont.   PTA Valsartan 160 mg daily"~"HLD "~"- cont. Fenofibrate "~"HX SBO"~"HX appendectomy  "~"DVT Px: SCD"~"Full code"~"IP MD "

## 2025-01-04 VITALS — SYSTOLIC BLOOD PRESSURE: 98 MMHG | RESPIRATION RATE: 18 BRPM | DIASTOLIC BLOOD PRESSURE: 64 MMHG

## 2025-01-04 VITALS — DIASTOLIC BLOOD PRESSURE: 59 MMHG | SYSTOLIC BLOOD PRESSURE: 106 MMHG | RESPIRATION RATE: 16 BRPM

## 2025-01-04 VITALS — DIASTOLIC BLOOD PRESSURE: 57 MMHG | RESPIRATION RATE: 16 BRPM | SYSTOLIC BLOOD PRESSURE: 100 MMHG

## 2025-01-04 VITALS — DIASTOLIC BLOOD PRESSURE: 59 MMHG | SYSTOLIC BLOOD PRESSURE: 106 MMHG | HEART RATE: 96 BPM | OXYGEN SATURATION: 95 %

## 2025-01-04 VITALS — SYSTOLIC BLOOD PRESSURE: 99 MMHG | RESPIRATION RATE: 18 BRPM | DIASTOLIC BLOOD PRESSURE: 58 MMHG

## 2025-01-04 VITALS — SYSTOLIC BLOOD PRESSURE: 98 MMHG | RESPIRATION RATE: 16 BRPM | DIASTOLIC BLOOD PRESSURE: 56 MMHG

## 2025-01-04 VITALS — SYSTOLIC BLOOD PRESSURE: 106 MMHG | HEART RATE: 100 BPM | OXYGEN SATURATION: 95 % | DIASTOLIC BLOOD PRESSURE: 59 MMHG

## 2025-01-04 VITALS — SYSTOLIC BLOOD PRESSURE: 92 MMHG | RESPIRATION RATE: 18 BRPM | DIASTOLIC BLOOD PRESSURE: 62 MMHG

## 2025-01-04 VITALS — DIASTOLIC BLOOD PRESSURE: 53 MMHG | SYSTOLIC BLOOD PRESSURE: 102 MMHG | RESPIRATION RATE: 16 BRPM

## 2025-01-04 LAB
BUN SERPL-MCNC: 30 MG/DL (ref 7–17)
CALCIUM SERPL-MCNC: 9 MG/DL (ref 8.4–10.2)
CHLORIDE SERPL-SCNC: 97 MMOL/L (ref 98–107)
CO2 SERPL-SCNC: 29 MMOL/L (ref 22–30)
EGFR: > 60
ESTIMATED CREATININE CLEARANCE: 56 ML/MIN
FERRITIN SERPL-MCNC: 326 NG/ML (ref 11.1–264)
GLUCOSE SERPL-MCNC: 131 MG/DL (ref 70–99)
HCT VFR BLD AUTO: 32.6 % (ref 37–47)
HGB BLD-MCNC: 10.9 G/DL (ref 12–16)
IRON SERPL-MCNC: 24 UG/DL (ref 37–170)
POTASSIUM SERPL-SCNC: 4.1 MMOL/L (ref 3.5–5.1)
SODIUM SERPL-SCNC: 133 MMOL/L (ref 135–145)
TIBC SERPL-MCNC: 315 UG/DL (ref 265–497)
VIT B12 SERPL-MCNC: > 1000 PG/ML (ref 239–931)

## 2025-01-04 RX ADMIN — SENNOSIDES 17.2 MG: 8.6 TABLET ORAL at 08:49

## 2025-01-04 RX ADMIN — CEFAZOLIN 5: 10 INJECTION, POWDER, FOR SOLUTION INTRAVENOUS at 08:52

## 2025-01-04 RX ADMIN — ACETAMINOPHEN 650 MG: 325 TABLET ORAL at 04:12

## 2025-01-04 RX ADMIN — SENNOSIDES 17.2 MG: 8.6 TABLET ORAL at 21:00

## 2025-01-04 RX ADMIN — ACETAMINOPHEN 650 MG: 325 TABLET ORAL at 18:17

## 2025-01-04 RX ADMIN — OXYCODONE HYDROCHLORIDE 5 MG: 5 TABLET ORAL at 21:01

## 2025-01-04 RX ADMIN — ACETAMINOPHEN 650 MG: 325 TABLET ORAL at 08:51

## 2025-01-04 RX ADMIN — DOCUSATE SODIUM 100 MG: 100 CAPSULE, LIQUID FILLED ORAL at 21:00

## 2025-01-04 RX ADMIN — ACETAMINOPHEN 650 MG: 325 TABLET ORAL at 14:08

## 2025-01-04 RX ADMIN — DOCUSATE SODIUM 100 MG: 100 CAPSULE, LIQUID FILLED ORAL at 08:50

## 2025-01-04 RX ADMIN — FENOFIBRATE 145 MG: 145 TABLET, FILM COATED ORAL at 08:48

## 2025-01-04 RX ADMIN — ACETAMINOPHEN: 325 TABLET ORAL at 13:58

## 2025-01-04 RX ADMIN — OXYCODONE HYDROCHLORIDE 5 MG: 5 TABLET ORAL at 08:56

## 2025-01-04 RX ADMIN — ASPIRIN 325 MG: 325 TABLET ORAL at 08:51

## 2025-01-04 NOTE — W.PN.HOSP.TC
"Today's Communication/Plan"~"-"~"-: "~"PT OT"~"Pain control"~"Assessment / Plan"~"Assessment / Plan"~"-: "~"69-year-old female presented with a fall. Landed on left hip."~"CVS: S1-S2 normal"~"Chest: CTA B/L"~"Abdomen: Soft, NT / Bowel sounds present"~"Extremities: Hip site looks stable no bleeding"~"# Left hip fracture after fall-traumatic"~"Status post left hip hemiarthroplasty 1/3/25- "~"Hip precautions"~"Aspirin 3 to 5 mg daily for 4 weeks"~"Pain management"~"PT OT"~"WBAT"~"# Hypertension-on valsartan as outpatient"~"# Hyperlipidemia-takes niacin and fenofibrate"~"# DVT prophylaxis-SCDs, ASA"~"# Full code"~"Discussed with family at bedside"~"She is hoping to go home and not to rehab"~"Anticipated Discharge: Within 24 hours"~"Subjective/Interval History"~"-"~"-: "~"Date of Service:  January 4, 2025"~"Objective Data"~"-"~"Labs: "~"Laboratory Results"~" 	01/04/25"~" 	12:34"~"Hgb	 Pending"~"Hct	 Pending"~"Sodium	 Pending"~"Potassium	 Pending"~"Chloride	 Pending"~"Carbon Dioxide	 Pending"~"BUN	 Pending"~"Creatinine	 Pending"~"Glucose	 Pending"~"Calcium	 Pending"~"Vital Signs: "~"Vital Signs"~"Temp	Pulse	Resp	BP	Pulse Ox"~" 98.0 F 	 96 	 16 	 106/59 	 95 "~" 01/04/25 11:07	 01/04/25 11:07	 01/04/25 11:07	 01/04/25 11:07	 01/04/25 08:46"~"I&O"~"	01/03/25	01/04/25	01/05/25"~"	06:59	06:59	06:59"~"Intake Total		960 / 960	"~"Output Total		500 / 500	"~"Balance		460 / 460	"

## 2025-01-04 NOTE — W.PN.ORTHO
"Today's Communication / Plan"~"-"~"-: "~"POD#1 left hip hemiarthroplasty under the direction with Dr. Tejada"~"--Weight bearing as tolerated. Ambulate with assistance of walker"~"--PT/OT"~"--Hip precautions. Abduction pillow when in bed"~"--Recommend Aspirin 325mg daily for DVT prophylaxis for 4 weeks postop"~"--Continue with pain management as needed"~"--Dressing to remain in place until follow up appointment"~"--Case management consult for discharge planning"~"--Will continue to follow along"~"Assessment"~"."~"Distal Motor Intact: Yes"~"Dressing: "~"Clean, dry and intact."~"Plan"~"."~"Surgery / Date: Left hip tyler 1/3/25 ([facility])"~"DVT Prophylaxis: Aspirin"~"Activity: "~"Out of bed."~"PT/OT"~"Subjective"~"."~".: "~"Patient resting comfortably in bed this morning. She reports that her pain is currently well controlled. She has not been out of bed yet"~"Vital Signs and Labs"~"."~"Vital Signs and Labs: "~"Lab Results"~"01/03/25 10:12 "~"01/03/25 10:12 "~"Temp	Pulse	Resp	BP	Pulse Ox"~" 98.2 F 	 83 	 16 	 100/57 	 95 "~" 01/04/25 07:15	 01/04/25 07:15	 01/04/25 07:15	 01/04/25 07:15	 01/04/25 07:15"~"PT	 13.2 Sec (11.4-14.6)  	01/03/25  10:12    "~"INR	 0.97  	01/03/25  10:12    "~"Non-invasive Hgb result: 12.5"~"Physical Exam"~"-"~"-: "~"Directed exam of left hip reveals surgical dressing in place. this is clean, dry, and intact. mild discomfort with palpation of thigh. compartments of thigh soft and compressible. able to plantarflex and dorsiflex the ankle. calf soft and nontender. "~"NVI distally"

## 2025-01-04 NOTE — CM
"Addendum entered by Jaylene Stevens  01/04/25 14:31: "~"Referral sent in [facility] for HH needs"~"Plan - anticipate home with [facility] when medically ready"~"Original Note:"~"Met with pt and her  at bedside"~"Pt reports she lives in a 4 story home with her  and adult son who has Down's Syndrome; 2 steps to enter, FF set-up"~"Independent at baseline, drives. 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

## 2025-01-05 VITALS — RESPIRATION RATE: 16 BRPM | SYSTOLIC BLOOD PRESSURE: 96 MMHG | DIASTOLIC BLOOD PRESSURE: 62 MMHG

## 2025-01-05 VITALS — DIASTOLIC BLOOD PRESSURE: 53 MMHG | RESPIRATION RATE: 16 BRPM | SYSTOLIC BLOOD PRESSURE: 104 MMHG

## 2025-01-05 VITALS — SYSTOLIC BLOOD PRESSURE: 100 MMHG | DIASTOLIC BLOOD PRESSURE: 57 MMHG | RESPIRATION RATE: 16 BRPM

## 2025-01-05 VITALS — OXYGEN SATURATION: 95 % | SYSTOLIC BLOOD PRESSURE: 104 MMHG | HEART RATE: 101 BPM | DIASTOLIC BLOOD PRESSURE: 59 MMHG

## 2025-01-05 LAB
BUN SERPL-MCNC: 19 MG/DL (ref 7–17)
CALCIUM SERPL-MCNC: 8.3 MG/DL (ref 8.4–10.2)
CHLORIDE SERPL-SCNC: 103 MMOL/L (ref 98–107)
CO2 SERPL-SCNC: 26 MMOL/L (ref 22–30)
EGFR: > 60
ESTIMATED CREATININE CLEARANCE: 70 ML/MIN
GLUCOSE SERPL-MCNC: 122 MG/DL (ref 70–99)
HCT VFR BLD AUTO: 29 % (ref 37–47)
HGB BLD-MCNC: 9.9 G/DL (ref 12–16)
POTASSIUM SERPL-SCNC: 4 MMOL/L (ref 3.5–5.1)
SODIUM SERPL-SCNC: 136 MMOL/L (ref 135–145)

## 2025-01-05 RX ADMIN — ACETAMINOPHEN 650 MG: 325 TABLET ORAL at 13:12

## 2025-01-05 RX ADMIN — SODIUM FERRIC GLUCONATE COMPLEX IN SUCROSE 110 MG: 12.5 INJECTION INTRAVENOUS at 13:44

## 2025-01-05 RX ADMIN — ACETAMINOPHEN 650 MG: 325 TABLET ORAL at 16:38

## 2025-01-05 RX ADMIN — SENNOSIDES 17.2 MG: 8.6 TABLET ORAL at 19:55

## 2025-01-05 RX ADMIN — FENOFIBRATE 145 MG: 145 TABLET, FILM COATED ORAL at 09:36

## 2025-01-05 RX ADMIN — ACETAMINOPHEN 650 MG: 325 TABLET ORAL at 19:55

## 2025-01-05 RX ADMIN — SENNOSIDES 17.2 MG: 8.6 TABLET ORAL at 09:36

## 2025-01-05 RX ADMIN — ACETAMINOPHEN: 325 TABLET ORAL at 04:55

## 2025-01-05 RX ADMIN — DOCUSATE SODIUM 100 MG: 100 CAPSULE, LIQUID FILLED ORAL at 19:55

## 2025-01-05 RX ADMIN — OXYCODONE HYDROCHLORIDE 5 MG: 5 TABLET ORAL at 19:57

## 2025-01-05 RX ADMIN — ACETAMINOPHEN: 325 TABLET ORAL at 01:06

## 2025-01-05 RX ADMIN — OXYCODONE HYDROCHLORIDE 5 MG: 5 TABLET ORAL at 08:40

## 2025-01-05 RX ADMIN — DOCUSATE SODIUM 100 MG: 100 CAPSULE, LIQUID FILLED ORAL at 09:35

## 2025-01-05 RX ADMIN — ACETAMINOPHEN 650 MG: 325 TABLET ORAL at 09:36

## 2025-01-05 RX ADMIN — ASPIRIN 325 MG: 325 TABLET ORAL at 09:34

## 2025-01-05 NOTE — W.PN.ORTHO
"Today's Communication / Plan"~"-"~"-: "~"POD#2 left hip hemiarthroplasty under the direction with Dr. Tejada"~"--Weight bearing as tolerated. Ambulate with assistance of walker"~"--PT/OT"~"--Hip precautions. Abduction pillow when in bed"~"--Recommend Aspirin 325mg daily for DVT prophylaxis for 4 weeks postop"~"--Continue with pain management as needed"~"--Dressing to remain in place until follow up appointment"~"--Case management consult for discharge planning"~"--Follow up outpatient with Dr. Tejada in 2 weeks"~"--Patient is orthopedically stable postop. Orthopedics will sign off. Please reach out with any additional questions or concerns."~"Assessment"~"."~"Distal Motor Intact: Yes"~"Dressing: "~"Clean, dry and intact."~"Plan"~"."~"Surgery / Date: Left hip tyler 1/3/25 ([facility])"~"DVT Prophylaxis: Aspirin"~"Activity: "~"Out of bed."~"PT/OT"~"Subjective"~"."~".: "~"Patient resting comfortably. she reports that she has more pain today. she was able to walk yesterday"~"Vital Signs and Labs"~"."~"Vital Signs and Labs: "~"Temp	Pulse	Resp	BP	Pulse Ox"~" 98.8 F 	 85 	 16 	 100/57 	 93 "~" 01/05/25 07:15	 01/05/25 07:15	 01/05/25 07:15	 01/05/25 07:15	 01/05/25 07:15"~"PT	 13.2 Sec (11.4-14.6)  	01/03/25  10:12    "~"INR	 0.97  	01/03/25  10:12    "~"Non-invasive Hgb result: 12.5"~"Physical Exam"~"-"~"-: "~"Directed exam of left hip reveals surgical dressing in place. this is clean, dry, and intact. mild discomfort with palpation of thigh. compartments of thigh soft and compressible. able to plantarflex and dorsiflex the ankle. calf soft and nontender. "~"NVI distally"

## 2025-01-05 NOTE — W.PN.HOSP.TC
"Today's Communication/Plan"~"-"~"-: "~"Discharge planning"~"Discharge tomorrow"~"Assessment / Plan"~"Assessment / Plan"~"-: "~"69-year-old female presented with a fall. Landed on left hip."~"CVS: S1-S2 normal"~"Chest: CTA B/L"~"Abdomen: Soft, NT / Bowel sounds present"~"Extremities: Hip site looks stable no bleeding"~"# Left hip fracture after fall-traumatic"~"Status post left hip hemiarthroplasty 1/3/25- "~"Hip precautions"~"Aspirin 325 mg daily for 4 weeks"~"Pain management"~"PT OT"~"WBAT"~"# Acute blood loss anemia secondary to surgery and also iron deficiency anemia-IV iron.  P.o. iron at discharge discussed with the patient.  Outpatient workup for iron deficiency"~"# Hypertension-on valsartan as outpatient"~"# Hyperlipidemia-takes niacin and fenofibrate"~"# DVT prophylaxis-SCDs, ASA"~"# Full code"~"Discussed with nurse at bedside"~"She is hoping to go home tomorrow"~"Anticipated Discharge: Within 24 hours"~"Subjective/Interval History"~"-"~"-: "~"Date of Service:  January 5, 2025"~"Objective Data"~"-"~"Labs: "~"Laboratory Results"~" 	01/05/25"~" 	09:20"~"Hgb	 9.9 L"~"Hct	 29.0 L"~"Sodium	 136"~"Potassium	 4.0"~"Chloride	 103"~"Carbon Dioxide	 26"~"BUN	 19 H"~"Creatinine	 0.8"~"Glucose	 122 H"~"Calcium	 8.3 L"~"Vital Signs: "~"Vital Signs"~"Temp	Pulse	Resp	BP	Pulse Ox"~" 98.8 F 	 85 	 16 	 100/57 	 93 "~" 01/05/25 07:15	 01/05/25 07:15	 01/05/25 07:15	 01/05/25 09:34	 01/05/25 08:40"~"I&O"~"	01/04/25	01/05/25	01/06/25"~"	06:59	06:59	06:59"~"Intake Total	960 / 960	1700 / 1700	"~"Output Total	500 / 500	450 / 450	"~"Balance	460 / 460	1250 / 1250	"

## 2025-01-06 VITALS — DIASTOLIC BLOOD PRESSURE: 58 MMHG | RESPIRATION RATE: 18 BRPM | SYSTOLIC BLOOD PRESSURE: 108 MMHG

## 2025-01-06 VITALS — SYSTOLIC BLOOD PRESSURE: 111 MMHG | DIASTOLIC BLOOD PRESSURE: 61 MMHG | RESPIRATION RATE: 16 BRPM

## 2025-01-06 RX ADMIN — ACETAMINOPHEN: 325 TABLET ORAL at 00:15

## 2025-01-06 RX ADMIN — SENNOSIDES: 8.6 TABLET ORAL at 07:54

## 2025-01-06 RX ADMIN — ACETAMINOPHEN 650 MG: 325 TABLET ORAL at 07:50

## 2025-01-06 RX ADMIN — DOCUSATE SODIUM: 100 CAPSULE, LIQUID FILLED ORAL at 07:54

## 2025-01-06 RX ADMIN — ACETAMINOPHEN 650 MG: 325 TABLET ORAL at 12:09

## 2025-01-06 RX ADMIN — ACETAMINOPHEN 650 MG: 325 TABLET ORAL at 04:09

## 2025-01-06 RX ADMIN — ASPIRIN 325 MG: 325 TABLET ORAL at 07:50

## 2025-01-06 RX ADMIN — FENOFIBRATE 145 MG: 145 TABLET, FILM COATED ORAL at 07:50

## 2025-01-06 NOTE — W.DS.TRANS
"Addendum entered and electronically signed by Aurea Wills MD  01/06/25 15:40: "~"Dictation-[id number]"~"Original Note:"~"DC Summary - Transcription"~"-"~"Discharge Instructions: "~"Discharge Diagnosis/Procedures	Hip fracture s/p repair                         	"~"                              	Hypertension                                    	"~"                              	Anemia                                          	"~"                              	Iron deficiency                                 	"~"Diet                          	As tolerated                                    	"~"Activity                      	Other activity                                  	"~"Additional Activity           	Follow postoperative instructions and hip       	"~"                              	precautions                                     	"~"Driving Restrictions          	No driving                                      	"~"Blood Work                    	CBC 3 to 4 days                                 	"~"Other Services                	VN                                              	"~"Instructions:       "~"Stand-Alone Forms:  "~"Changes to Home Medications: Yes"~"Discharge Medications: "~"DC Medications w/original date entered in [employer]"~"cholecalciferol (vitamin D3) 25 mcg (1,000 unit) tablet (Vitamin D3) 25 mcg PO DAILY Supplement 01/03/25 "~"fenofibrate 160 mg tablet 160 mg PO DAILY High Cholesterol 01/03/25 "~"fluticasone propionate 50 mcg/actuation nasal spray,suspension 1 spray intranasal DAILY Allergies 01/03/25 "~"lactobacillus combination no.4 3 billion cell capsule (Probiotic) 3,000 mmu cells PO DAILY Supplement 01/03/25 "~"niacin 250 mg tablet,extended release (Endur-Acin) 250 mg PO DAILY Supplement 01/03/25 "~"valsartan 160 mg tablet 160 mg PO DAILY Blood Pressure 01/03/25 "~"vitamin K2 40 mcg tablet 40 mcg PO DAILY Supplement 01/03/25 "~"acetaminophen 500 mg oral powder packet (Tylenol Extra Strength) 500 mg PO Q6H PRN mild pain #32 ea 01/06/25 "~"aspirin 325 mg tablet 325 mg PO DAILY Blood clot prevention/tx #26 tabs 01/06/25 "~"docusate sodium 100 mg capsule 100 mg PO BID Constipation #0 caps 01/06/25 "~"famotidine 20 mg tablet (Pepcid) 20 mg PO BID while on ASA #60 tabs 01/06/25 "~"oxycodone 5 mg tablet 5 mg PO Q4HPRN PRN moderate pain #15 tabs 01/06/25 "~"sennosides 8.6 mg tablet (Senna Laxative) 17.2 mg (2 x 8.6 mg) PO HS PRN when you take Oxycodone #0 tabs 01/06/25 "~"Home Medication Changes  "~"new"~"acetaminophen 500 mg oral powder packet (Tylenol Extra Strength) 500 mg PO Q6H PRN mild pain #32 ea 01/06/25 "~"aspirin 325 mg tablet 325 mg PO DAILY Blood clot prevention/tx #26 tabs 01/06/25 "~"docusate sodium 100 mg capsule 100 mg PO BID Constipation #0 caps 01/06/25 "~"famotidine 20 mg tablet (Pepcid) 20 mg PO BID while on ASA #60 tabs 01/06/25 "~"oxycodone 5 mg tablet 5 mg PO Q4HPRN PRN moderate pain #15 tabs 01/06/25 "~"sennosides 8.6 mg tablet (Senna Laxative) 17.2 mg (2 x 8.6 mg) PO HS PRN when you take Oxycodone #0 tabs 01/06/25 "~"Pending Results: No"

## 2025-01-06 NOTE — W.PN.HOSP.TC
"Today's Communication/Plan"~"-"~"-: "~"Discharge after IV iron"~"Assessment / Plan"~"Assessment / Plan"~"-: "~"69-year-old female presented with a fall. Landed on left hip."~"CVS: S1-S2 normal"~"Chest: CTA B/L"~"Abdomen: Soft, NT / Bowel sounds present"~"Extremities: left thigh slightly edematous, likely source of "~"# Left hip fracture after fall-traumatic"~"Status post left hip hemiarthroplasty 1/3/25- "~"Hip precautions"~"Aspirin 325 mg daily for 4 weeks"~"Pain management"~"PT OT"~"WBAT"~"# Acute blood loss anemia secondary to surgery and also iron deficiency anemia-IV iron.  P.o. iron at discharge discussed with the patient.  Outpatient workup for iron deficiency"~"Rectal heme neg brown stool"~"Script given for CBC 3-4 days with results to PCP"~"# Hypertension-on valsartan as outpatient"~"# Hyperlipidemia-takes niacin and fenofibrate"~"# DVT prophylaxis-SCDs, ASA"~"# Full code"~"Discussed with nurse at bedside"~"She is hoping to go home "~"Anticipated Discharge: Today"~"Subjective/Interval History"~"-"~"-: "~"Date of Service:  January 6, 2025"~"Objective Data"~"-"~"Vital Signs: "~"Vital Signs"~"Temp	Pulse	Resp	BP	Pulse Ox"~" 98.2 F 	 89 	 16 	 111/61 	 89 "~" 01/06/25 07:05	 01/06/25 07:05	 01/06/25 07:05	 01/06/25 07:05	 01/06/25 07:05"~"I&O"~"	01/05/25	01/06/25	01/07/25"~"	06:59	06:59	06:59"~"Intake Total	1700 / 1700	1940 / 1940	"~"Output Total	450 / 450		"~"Balance	1250 / 1250	1940 / 1940	"

## 2025-01-06 NOTE — W.PA-PDMP
"PA-PDMP"~"-"~"-: "~"Checked the [place]- Prescription Drug Monitoring Program website, no red flags identified; safe to proceed with prescription."

## 2025-01-06 NOTE — CM
"Addendum entered by Tracey Brantley  01/06/25 11:11: "~" to transport"~"Original Note:"~"Patient for discharge today."~"case management consult completed VN"~"[facility] accepted."~"IMM explained & signed. In chart"~"PLAN: Home, [facility]"~"                  Fax #: [phone]"

## 2025-01-20 ENCOUNTER — PROBLEM (OUTPATIENT)
Dept: URBAN - METROPOLITAN AREA CLINIC 79 | Facility: CLINIC | Age: 70
End: 2025-01-20

## 2025-01-20 ASSESSMENT — VISUAL ACUITY
OS_SC: 20/25
OD_SC: 20/25-1

## 2025-01-27 ENCOUNTER — FOLLOW UP (OUTPATIENT)
Dept: URBAN - METROPOLITAN AREA CLINIC 79 | Facility: CLINIC | Age: 70
End: 2025-01-27

## 2025-01-27 DIAGNOSIS — H10.89: ICD-10-CM

## 2025-01-27 PROCEDURE — 99213 OFFICE O/P EST LOW 20 MIN: CPT

## 2025-01-27 ASSESSMENT — TONOMETRY
OS_IOP_MMHG: 10
OD_IOP_MMHG: 09

## 2025-01-27 ASSESSMENT — VISUAL ACUITY
OD_SC: 20/25
OS_SC: 20/30-1

## 2025-04-22 ENCOUNTER — ESTABLISHED COMPREHENSIVE EXAM (OUTPATIENT)
Dept: URBAN - METROPOLITAN AREA CLINIC 79 | Facility: CLINIC | Age: 70
End: 2025-04-22

## 2025-04-22 DIAGNOSIS — H43.813: ICD-10-CM

## 2025-04-22 DIAGNOSIS — Q14.1: ICD-10-CM

## 2025-04-22 DIAGNOSIS — H52.13: ICD-10-CM

## 2025-04-22 DIAGNOSIS — H35.413: ICD-10-CM

## 2025-04-22 DIAGNOSIS — H35.372: ICD-10-CM

## 2025-04-22 DIAGNOSIS — H35.362: ICD-10-CM

## 2025-04-22 PROCEDURE — 92134 CPTRZ OPH DX IMG PST SGM RTA: CPT

## 2025-04-22 PROCEDURE — 92014 COMPRE OPH EXAM EST PT 1/>: CPT

## 2025-04-22 ASSESSMENT — VISUAL ACUITY
OD_SC: 20/20-2
OS_SC: 20/70
OD_SC: 20/70
OS_SC: 20/25+2

## 2025-04-22 ASSESSMENT — TONOMETRY
OD_IOP_MMHG: 15
OS_IOP_MMHG: 15

## (undated) RX ORDER — BROMFENAC SODIUM 0.7 MG/ML
1 SOLUTION/ DROPS OPHTHALMIC ONCE A DAY
Start: 2022-03-29 | End: 2022-04-03

## (undated) RX ORDER — PREDNISOLONE ACETATE 10 MG/ML
1 SUSPENSION/ DROPS OPHTHALMIC
End: 2020-09-07

## (undated) RX ORDER — DUREZOL 0.5 MG/ML
1 EMULSION OPHTHALMIC ONCE A DAY
Start: 2020-07-12

## (undated) RX ORDER — HYDROXYZINE HYDROCHLORIDE 25 MG/1
1 TABLET, FILM COATED ORAL
Start: 2020-07-12

## (undated) RX ORDER — NEOMYCIN SULFATE, POLYMYXIN B SULFATE AND DEXAMETHASONE 3.5; 10000; 1 MG/G; [USP'U]/G; MG/G: 1/4 OINTMENT OPHTHALMIC TWICE A DAY

## (undated) RX ORDER — NEOMYCIN SULFATE, POLYMYXIN B SULFATE AND DEXAMETHASONE SUSPENSION/ DROPS 1; 3.5; 1 MG/ML; MG/ML; [USP'U]/ML: 1 SUSPENSION/ DROPS TOPICAL

## (undated) RX ORDER — HYDROXYZINE HYDROCHLORIDE 25 MG/1
1 TABLET, FILM COATED ORAL TWICE A DAY
End: 2020-08-20

## (undated) RX ORDER — BROMFENAC SODIUM 0.7 MG/ML: 1 SOLUTION/ DROPS OPHTHALMIC ONCE A DAY

## (undated) RX ORDER — BROMFENAC 0.76 MG/ML
1 SOLUTION/ DROPS OPHTHALMIC ONCE A DAY
End: 2020-10-10